# Patient Record
Sex: FEMALE | Race: WHITE
[De-identification: names, ages, dates, MRNs, and addresses within clinical notes are randomized per-mention and may not be internally consistent; named-entity substitution may affect disease eponyms.]

---

## 2017-01-04 ENCOUNTER — HOSPITAL ENCOUNTER (EMERGENCY)
Dept: HOSPITAL 25 - ED | Age: 67
LOS: 1 days | Discharge: HOME | DRG: 864 | End: 2017-01-05
Payer: MEDICARE

## 2017-01-04 DIAGNOSIS — F17.210: ICD-10-CM

## 2017-01-04 DIAGNOSIS — F03.90: ICD-10-CM

## 2017-01-04 DIAGNOSIS — R50.9: Primary | ICD-10-CM

## 2017-01-04 DIAGNOSIS — R53.83: ICD-10-CM

## 2017-01-04 DIAGNOSIS — R41.0: ICD-10-CM

## 2017-01-04 LAB
ADD DIFF/SLIDE REVIEW?: (no result)
ALBUMIN SERPL BCG-MCNC: 3.7 G/DL (ref 3.2–5.2)
ALP SERPL-CCNC: 107 U/L (ref 34–104)
ALT SERPL W P-5'-P-CCNC: 7 U/L (ref 7–52)
ANION GAP SERPL CALC-SCNC: 6 MMOL/L (ref 2–11)
AST SERPL-CCNC: 9 U/L (ref 13–39)
BUN SERPL-MCNC: 22 MG/DL (ref 6–24)
BUN/CREAT SERPL: 14.1 (ref 8–20)
CALCIUM SERPL-MCNC: 9.1 MG/DL (ref 8.6–10.3)
CHLORIDE SERPL-SCNC: 97 MMOL/L (ref 101–111)
GLOBULIN SER CALC-MCNC: 3.9 G/DL (ref 2–4)
GLUCOSE SERPL-MCNC: 183 MG/DL (ref 70–100)
HCO3 SERPL-SCNC: 23 MMOL/L (ref 22–32)
HCT VFR BLD AUTO: 33 % (ref 35–47)
HGB BLD-MCNC: 10.8 G/DL (ref 12–16)
MAGNESIUM SERPL-MCNC: 1.6 MG/DL (ref 1.9–2.7)
MCH RBC QN AUTO: 27 PG (ref 27–31)
MCHC RBC AUTO-ENTMCNC: 32 G/DL (ref 31–36)
MCV RBC AUTO: 83 FL (ref 80–97)
POTASSIUM SERPL-SCNC: 5.4 MMOL/L (ref 3.5–5)
PROT SERPL-MCNC: 7.6 G/DL (ref 6.4–8.9)
RBC # BLD AUTO: 4.04 10^6/UL (ref 4–5.4)
SODIUM SERPL-SCNC: 126 MMOL/L (ref 133–145)
TROPONIN I SERPL-MCNC: 0.05 NG/ML (ref ?–0.04)
TSH SERPL-ACNC: 0.39 MCIU/ML (ref 0.34–5.6)
WBC # BLD AUTO: 22 10^3/UL (ref 3.5–10.8)

## 2017-01-04 PROCEDURE — 80048 BASIC METABOLIC PNL TOTAL CA: CPT

## 2017-01-04 PROCEDURE — 84443 ASSAY THYROID STIM HORMONE: CPT

## 2017-01-04 PROCEDURE — 87641 MR-STAPH DNA AMP PROBE: CPT

## 2017-01-04 PROCEDURE — 93005 ELECTROCARDIOGRAM TRACING: CPT

## 2017-01-04 PROCEDURE — 71020: CPT

## 2017-01-04 PROCEDURE — G0480 DRUG TEST DEF 1-7 CLASSES: HCPCS

## 2017-01-04 PROCEDURE — 80053 COMPREHEN METABOLIC PANEL: CPT

## 2017-01-04 PROCEDURE — 86308 HETEROPHILE ANTIBODY SCREEN: CPT

## 2017-01-04 PROCEDURE — 81003 URINALYSIS AUTO W/O SCOPE: CPT

## 2017-01-04 PROCEDURE — 83605 ASSAY OF LACTIC ACID: CPT

## 2017-01-04 PROCEDURE — 80320 DRUG SCREEN QUANTALCOHOLS: CPT

## 2017-01-04 PROCEDURE — 99284 EMERGENCY DEPT VISIT MOD MDM: CPT

## 2017-01-04 PROCEDURE — 81015 MICROSCOPIC EXAM OF URINE: CPT

## 2017-01-04 PROCEDURE — 84145 PROCALCITONIN (PCT): CPT

## 2017-01-04 PROCEDURE — 87040 BLOOD CULTURE FOR BACTERIA: CPT

## 2017-01-04 PROCEDURE — 87502 INFLUENZA DNA AMP PROBE: CPT

## 2017-01-04 PROCEDURE — 85025 COMPLETE CBC W/AUTO DIFF WBC: CPT

## 2017-01-04 PROCEDURE — 84484 ASSAY OF TROPONIN QUANT: CPT

## 2017-01-04 PROCEDURE — 36415 COLL VENOUS BLD VENIPUNCTURE: CPT

## 2017-01-04 PROCEDURE — 83735 ASSAY OF MAGNESIUM: CPT

## 2017-01-04 NOTE — RAD
INDICATION: Shortness of breath. Cough. History of COPD and congestive heart failure.

Tobacco use.



COMPARISON: June 17, 2016



TECHNIQUE:  Dual energy PA and routine lateral views of the chest were obtained.



REPORT:  The patient is deviated to the LEFT with pendulous breasts superimposed over the

lung bases and resulting in artifactual opacity. Elevated lung volumes and moderately

coarse interstitial markings. Suggestion of small pleural effusions with interval decrease

on the LEFT. Mild prominence of the interstitial markings with subtle thickened peripheral

interlobular septa. Cardiomegaly. Mildly prominent central pulmonary vasculature. 



Unchanged mild mid thoracic spine vertebral compression fractures. No new thoracic spine

compression fractures evident. Healed fractures of the RIGHT seventh and eighth ribs noted

posterolaterally. No acute rib fracture evident.



IMPRESSION: The constellation of findings is most consistent with mild interstitial edema

and associated small pleural effusions less prominent than on the prior exam superimposed

on stigmata of advanced chronic obstructive pulmonary disease.

## 2017-01-04 NOTE — ED
Jordan LINARES Claudia, scribed for Miguel Oscar MD on 01/04/17 at 2204 .





Complex/Multi-Sys Presentation





- HPI Summary


HPI Summary: 





66 year old female presents to the ED via EMS. Family called EMS because per 

EMS pt is confused. Pt admits to fatigue. She is unable to identify where she 

is and the correct year. 





Level 5 caveat- confusion 





- History Of Current Complaint


Hx Obtained From: Patient


Hx From Patient Unobtainable Due To: Dementia


Associated Signs And Symptoms: Positive: Other - confused





- Allergies/Home Medications


Allergies/Adverse Reactions: 


 Allergies











Allergy/AdvReac Type Severity Reaction Status Date / Time


 


No Known Allergies Allergy   Verified 10/12/15 17:05














PMH/Surg Hx/FS Hx/Imm Hx


Previously Healthy: Yes


Endocrine/Hematology History: Reports: Hx Diabetes, Hx Anemia


Cardiovascular History: Reports: Hx Congestive Heart Failure, Hx Coronary 

Artery Disease, Hx Hypercholesterolemia, Hx Hypertension, Hx Myocardial 

Infarction - x 3


   Comment Only: Other Cardiovascular Problems/Disorders - -


Respiratory History: Reports: Hx Chronic Obstructive Pulmonary Disease (COPD), 

Other Respiratory Problems/Disorders - 1ppd smoker for 45 years


GI History: Reports: Hx Gastroesophageal Reflux Disease, Hx Hiatal Hernia, 

Other GI Disorders - hernia


 History: Reports: Hx Chronic Renal Failure - diabetic nephropathy, creat 

baseline 1.3


Sensory History: Reports: Hx Contacts or Glasses - reading


Opthamlomology History: Reports: Hx Contacts or Glasses - reading


Psychiatric History: Reports: Hx Anxiety, Hx Depression





- Surgical History


Surgery Procedure, Year, and Place: CARDIAC STENT PLACEMENT.  Cholecystectomy


Hx Anesthesia Reactions: No





- Immunization History


Date of Tetanus Vaccine: Unk


Date of Influenza Vaccine: Fall 2014





- Family History


Known Family History: Positive: Cardiac Disease - 2 grandparents, Other - 

father had cancer, unknown what kind. mother had breast cancer





- Social History


Lives: With Family


Alcohol Use: None


Hx Substance Use: No


Substance Use Type: Reports: None


Hx Tobacco Use: Yes - smokes 1/2 pack per day


Smoking Status (MU): Heavy Every Day Tobacco Smoker


Type: Cigarettes


Amount Used/How Often: 1 pack q day


Have You Smoked in the Last Year: Yes





Review of Systems





- ROS Summary


Review of Systems Summary: 





level 5 caveat- confused 


Positive: Fatigue


Neurological: Other - confused 


All Other Systems Reviewed And Are Negative: No





Physical Exam


Triage Information Reviewed: Yes


Vital Signs On Initial Exam: 


 Initial Vitals











Temp Pulse Resp BP Pulse Ox


 


 101 F   102   21   155/59   97 


 


 01/04/17 22:01  01/04/17 22:01  01/04/17 22:01  01/04/17 22:01  01/04/17 22:01











Vital Signs Reviewed: Yes


Appearance: Positive: Thin - malodorous


Skin: Positive: Warm


Head/Face: Positive: Normal Head/Face Inspection


Eyes: Positive: JESUSITA


ENT: Positive: Hearing grossly normal


Neck: Positive: Supple


Respiratory/Lung Sounds: Positive: Breath Sounds Present


Cardiovascular: Positive: Normal


Abdomen Description: Positive: Nontender, Soft


Bowel Sounds: Positive: Present


Musculoskeletal: Positive: Strength/ROM Intact


Neurological: Positive: Sensory/Motor Intact





Diagnostics





- Vital Signs


 Vital Signs











  Temp Pulse Resp BP Pulse Ox


 


 01/04/17 22:01  101 F  102  21  155/59  97














- Laboratory


Result Diagrams: 


 01/04/17 22:25





 01/04/17 22:25


Lab Statement: Any lab studies that have been ordered have been reviewed, and 

results considered in the medical decision making process.





- Radiology


  ** CHEST XRAY 


Xray Interpretation: Positive (See Comments) - THE CONSTELLATION OF FINDINGS IS 

MOST CONSISTENT WITH MILD INTERSTITIAL EDEMA AND ASSOCIATED SMALL PLEURAL 

EFFUSIONS LESS PROMINENT THAN ON THE PRIOR EXAN SUPERIMPOSED ON STIGMATA OF 

ADVANCED COPD.


Radiology Interpretation Completed By: Radiologist





- EKG


  ** 22:06


Cardiac Rate: NL


EKG Rhythm: Sinus Rhythm - 96 beats/min


EKG Interpretation: left posterior hemiblock 





Complex Multi-Symp Course/Dx


Assessment/Plan: Pt presents with confusion. After a work-up, CXR and EKG. Dr. Selby will admit patient to [facility].





- Diagnoses


Provider Diagnoses: 


 Fever








- Physician Notifications


Discussed Care Of Patient With: 23:49.  Discussed care of patient with Dr. Selby. Dr. Selby will admit patient to [facility].


Time Discussed With Above Provider: 23:50


Instructed by Provider To: Admit As Inpatient





Discharge





- Discharge Plan


Condition: Fair


Disposition: ADMITTED TO [facility] documentation as recorded by the Jordan abad Claudia accurately 

reflects the service I personally performed and the decisions made by me, 

Miguel Oscar MD.

## 2017-01-05 VITALS — SYSTOLIC BLOOD PRESSURE: 150 MMHG | DIASTOLIC BLOOD PRESSURE: 67 MMHG

## 2017-01-05 LAB
ANION GAP SERPL CALC-SCNC: 6 MMOL/L (ref 2–11)
BUN SERPL-MCNC: 19 MG/DL (ref 6–24)
BUN/CREAT SERPL: 13.9 (ref 8–20)
CALCIUM SERPL-MCNC: 8.6 MG/DL (ref 8.6–10.3)
CHLORIDE SERPL-SCNC: 105 MMOL/L (ref 101–111)
GLUCOSE SERPL-MCNC: 138 MG/DL (ref 70–100)
HCO3 SERPL-SCNC: 20 MMOL/L (ref 22–32)
HCT VFR BLD AUTO: 30 % (ref 35–47)
HGB BLD-MCNC: 9.6 G/DL (ref 12–16)
MANUAL ENTRY VERIFICATION: (no result)
MCH RBC QN AUTO: 26 PG (ref 27–31)
MCHC RBC AUTO-ENTMCNC: 32 G/DL (ref 31–36)
MCV RBC AUTO: 83 FL (ref 80–97)
MONO INTERNAL CONTROL: (no result)
MONO KIT LOT#: (no result)
POTASSIUM SERPL-SCNC: 5 MMOL/L (ref 3.5–5)
RBC # BLD AUTO: 3.63 10^6/UL (ref 4–5.4)
SODIUM SERPL-SCNC: 131 MMOL/L (ref 133–145)
WBC # BLD AUTO: 15 10^3/UL (ref 3.5–10.8)

## 2017-01-05 RX ADMIN — ASPIRIN SCH MG: 81 TABLET, COATED ORAL at 10:31

## 2017-01-05 RX ADMIN — ASPIRIN SCH: 81 TABLET, COATED ORAL at 07:24

## 2017-01-05 NOTE — HP
HISTORY AND PHYSICAL:

 

DATE OF ADMISSION:  01/05/17

 

TIME OF EVALUATION:  0015.

 

PRIMARY CARE PHYSICIAN:  Shabbir Roth MD

 

CHIEF COMPLAINT:  Fever and altered mental status.

 

HISTORY OF PRESENT ILLNESS:  This is a 66-year-old female with a past medical 
history of COPD, on chronic oxygen; diabetes; hypertension, who presents to the 
emergency room with fever and concern for altered mental status.  The patient 
states she started with a fever today and not feeling well.  Her family, 
daughter and son-in-law, who lives with her called the EMS, they were concerned 
about her mental status and she was sent here via EMS for further evaluation.  
The patient states she has a chronic cough.  She does not have worsening 
shortness of breath. No chest pain, no nausea, vomiting, diarrhea.  No 
abdominal pain.  No urinary symptoms.  No myalgias or rash.  No sick contacts.  
No recent travel.  Otherwise, remaining review of systems is negative.  In the 
emergency room, the patient had labs, imaging.  She was given a liter of fluid 
and was referred to the hospitalist service for further evaluation.

 

PAST MEDICAL HISTORY:

1.  COPD, on 2 L continuous.

2.  Diabetes.

3.  Coronary artery disease.

4.  GERD.

5.  Depression.

6.  Chronic pain.

7.  Tobacco use.

8.  Hyperlipidemia.

 

MEDICATIONS:  The patient has a limited provided, they are as follows:

 

1.  Duloxetine DR 60 mg p.o. daily.

2.  Glipizide 10 mg.

3.  Metformin 500 mg.

4.  Oxycodone/acetaminophen 10/325.

5.  Spironolactone 25 mg p.o. daily.

6.  Simvastatin 80 mg p.o. daily.

7.  Gabapentin 600 mg.

 

Of note, this list does not provide frequency of when she takes them.  She 
states she takes inhalers but is unable to tell me which one they are.  On the 
discharge summary from June, it appears she takes albuterol inhaler, 
fluticasone and salmeterol one puff inhaled b.i.d., and Spiriva 1 inhaled daily.

 

ALLERGIES:  No known drug allergies.

 

FAMILY HISTORY:  Reviewed and noncontributory.

 

SOCIAL HISTORY:  The patient lives with her daughter and son-in-law.  She is 
still smoking.  She states she has been smoking 2 packs per day for 50 years.  
No alcohol or illicit drug use.  Her daughter, Airam Pickard, is her healthcare 
proxy.  She is a DNR/DNI.

 

REVIEW OF SYSTEMS:  As mentioned in the HPI.

 

                               PHYSICAL EXAMINATION

 

GENERAL:  In no acute distress, disheveled appearing.

 

VITAL SIGNS:  Temp T-max of 101, pulse rate 102, respiratory rate 21, oxygen 
saturation 97% on 2 L, blood pressure 155/59.

 

HEENT:  Pupils equal and reactive, anicteric.  Oropharynx:  Mucous membranes 
are moist.  No erythema or exudate.  Head:  Normocephalic.

 

NECK:  Supple.  No lymphadenopathy.  No nuchal rigidity.

 

RESPIRATORY:  Diminished breath sounds.  No wheezes, rhonchi, or rales. Poor 
aeration.

 

CARDIAC:  Regular rate and rhythm.  Distant heart sounds.  Soft systolic murmur 
heard throughout.

 

ABDOMEN:  Soft, nontender, nondistended.

 

EXTREMITIES:  The patient with poor hygiene in her lower extremities.  No 
clubbing, cyanosis, or edema.

 

NEUROLOGIC:  Alert and oriented x2; oriented to place and self.  No focal 
neurologic deficits.

 

 DIAGNOSTIC STUDIES/LAB DATA:  White count 22, hemoglobin 10.8, hematocrit 33, 
platelets 419.  Sodium 126, potassium 5.4, chloride 97, BUN 22, creatinine 1.56
, bicarb 23, glucose 183, magnesium 1.6, lactic acid 1.6.  Troponin 0.05.  
Urine is unremarkable, +2 protein.  Toxicology is negative alcohol.

 

Chest x-ray:  No significant findings on wet read.

 

EKG showing normal sinus.

 

ASSESSMENT AND PLAN:  This is a 66-year-old female with a past medical history 
of chronic obstructive pulmonary disease, on continuous oxygen, who is still 
smoking who presents to the emergency room with fever and altered mental status.

 

1.  Fever and altered mental status.  

Assessment:  The patient's history and physical are consistent with systemic 
inflammatory response syndrome.  It is unclear the etiology behind her fever.  
I am swabbing her right now for flu.  She has no respiratory symptoms.  Her 
chest x-ray is unremarkable.  This could be early onset of viral illness, not 
otherwise specified.  

Plan:  No indication for antibiotics at this time.  We will follow up on blood 
cultures.  If she continues to deteriorate, we will start her on antibiotics. 
If she gets dehydrated, we will continue to hydrate her.  We will follow up on 
the flu swab and repeat her labs in the morning.



2.  Acute kidney injury.  Assessment is likely in the setting of her infectious 
process.  It appears that she has an underlying chronic kidney disease based on 
prior creatinines.  

Plan:  We will renally dose her meds.  Repeat her labs in the morning and IV 
fluids.



3.  Indeterminate troponin.  Assessment:  The patient with a troponin of 0.05 
with no significant findings on EKG and no chest pain.  She does have a history 
of coronary artery disease.  I suspect this is demand ischemia in the setting 
of her infection.  

Plan:  We will put her on telemetry, trend her troponins, and start her on a 
baby aspirin.

 

CHRONIC MEDICAL PROBLEMS:

1.  COPD.  We will resume her presumed inhaler regimen that was from discharge 
summary in June of 2016.

2.  Diabetes.  We will place her on lispro and Lantus, hold oral agents.  We 
will continue her Effexor and her gabapentin and recommend contacting the 
pharmacy in the morning to get an accurate complete med rec for the remaining 
medications.

3.  FEN.  We will place her on a diabetic diet.

4.  DVT prophylaxis.  She scores high risk.  Will be placed on heparin subcu 
t.i.d.

5.  Disposition planning.  Due to patient's appearing poorly disheveled and not 
well taking care of, we will consult Social Work to help with safe discharge 
planning.

6.  Code status.  DNR/DNI.

 

PATIENT TIME:  Greater than 45 minutes spent doing the history and physical, 
more than half the time was spent in direct patient contact.

 

CC:  Shabbir Roth MD*

 

[id number]/[facility] #: [id number]

BRIAN

## 2017-01-06 NOTE — DS
DISCHARGE SUMMARY:

 

DATE OF ADMISSION:  01/05/17

 

DATE OF DISCHARGE:  01/05/17

 

PRIMARY CARE PHYSICIAN:  Dr. Shabbir Roth.

 

DISCHARGE DIAGNOSIS:  An episode of fever and altered mental status, most 
likely due to systemic inflammatory response syndrome and most likely viral 
illness.

 

SECONDARY DIAGNOSES:

1.  Chronic obstructive pulmonary disease, on 2 L continuously.

2.  Diabetes.

3.  Coronary artery disease.

4.  Gastroesophageal reflux disease.

5.  Depression.

6.  Chronic pain.

7.  History of chronic tobacco use.

8.  Hyperlipidemia.

 

MEDICATIONS AT DISCHARGE:  Unchanged from admission and include:

 

1.  Duloxetine DR 60 mg daily.

2.  Glipizide 10 mg daily.

3.  Metformin 500 mg daily.

4.  Oxycodone/acetaminophen 10/325 mg on a p.r.n. basis.

5.  Aldactone 25 mg daily.

6.  Simvastatin 80 mg daily.

7.  Gabapentin 600 mg previously taken.

8.  The patient also has Spiriva 1 inhalation daily.

9.  The patient is instructed to continue her albuterol inhaler and fluticasone 
and salmeterol inhalers as previously taken.

10.  The patient is using DuoNeb nebulizers on a p.r.n. basis, and she is to 
continue them as previously taken.

 

HOSPITALIZATION COURSE:  Please note the patient was at ED hold during her 15-
hour hospital stay.  She initially was admitted to the hospital on 01/05/17 
early after midnight with an alteration of mental status.  She appeared to be 
in systemic inflammatory response syndrome with leukocytosis, but no evidence 
of infection. She also had a temperature of 101 degrees.  Troponin was mildly 
elevated at 0.05 and it is chronic and consistent with prior reports.  Her EKG 
was unchanged.  Her magnesium was at 1.6 and that was replaced her with IV 
magnesium.  She also has chronic kidney disease stage 3, most likely due to the 
diabetes with creatinine that had been unchanged, but she was slightly 
hyponatremic and hyperkalemic that resolved after treatment with intravenous 
fluids.

 

Nevertheless, once the patient was seen within the 14 hours of her admission, 
she was back to her baseline mentally.  The patient does have history of being 
confused at night and I believe she probably sundowns.  In fact, she was 
thoroughly evaluated for a similar occurrence in March 2016 and please refer to 
further medical records for this evaluation that included an EEG.

 

Once again, the patient's leukocytosis with a white blood cell count of 22,000 
resolved spontaneously and was down to 15,000 after intravenous fluids in the 
emergency department.  The patient had been euthermic throughout her hospital 
stay apart from the initial temperature of 101 degrees recorded at admission.  
Her microbiology studies show influenza tests that were negative.  Her blood 
cultures are still pending.  Her urinalysis was unremarkable for infection.

 

At this point, the patient is ready to go home and appears to be back to her 
baseline.  She is going to be discharged home with recommendations to follow up 
with Dr. Roth in approximately 4-7 days.  No medications are changed.

 

Smoking cessation was strongly recommend to the patient, but the patient 
appears not too willing to follow those recommendations and she still smokes a 
pack per day of cigarettes.  She is aware of the dangers of smoking when on 
oxygen.  I strongly recommended against it.

 

PHYSICAL EXAMINATION AT THE TIME OF DISCHARGE:  Blood pressure of 150/67, heart 
rate of 97 and regular, respiratory rate 18, oxygen saturation 95% on 2 L O2 
nasal cannula, temperature 98.8.

 

General:  The patient is a very pleasant 66-year-old female, slightly disheveled
, of thin body habitus, who is in no acute distress.  The patient is alert, 
awake, and oriented x2.  She is oriented to the month of January and 2017, but 
not the exact date.

 

HEENT:  Head atraumatic, normocephalic.  Eyes:  Pupils equal, reactive to light 
and accommodation.  Oropharynx is clear.  Mucosa moist.

 

Neck:  Supple.  No JVD.  No bruit bilaterally.

 

Cardiovascular:  Regular rate and rhythm.  No murmur.

 

Respiratory:  Distant breath sounds bilaterally with very scattered wheezes in 
the bilateral upper lungs.  Otherwise clear.

 

Abdomen:  Soft, nontender, bowel sounds positive in all 4 quadrants.  Visible 
midline abdominal hernia noted that is supraumbilical.  There is no tenderness 
to palpation of the hernia.

 

Extremities:  There is no edema.  Pulses +2 bilaterally.  No clubbing or 
cyanosis.

 

Neurologic:  Speech clear.  Cranial nerves II through XII grossly intact.  
Motor strength is 5/5 bilaterally.

 

Please note that this is a short summary of the patient's hospital stay.  
Please refer to further medical records for details.

 



ADDENDUM TO DISCHARGE SUMMARY:

 

DATE OF ADMISSION:  01/05/17

 

DATE OF DISCHARGE:  01/05/17

 

HOME MEDICATIONS:

1.  Advair Diskus 50/50 one inhalation twice a day.

2.  Albuterol inhaler 2 puffs every 4 hours p.r.n.

3.  Aspirin 81 mg daily.

4.  Digoxin 250 mcg daily.

5.  Cymbalta 60 mg daily.

6.  Pepcid 40 mg b.i.d.

7.  Gabapentin 600 mg 3 times a day.

8.  Glipizide 10 mg b.i.d.

9.  Metformin 500 mg b.i.d.

10.  Ramipril 2.5 mg daily.

11.  Risperdal 0.25 mg at night.

12.  Simvastatin 80 mg daily.

13.  Iron  mg 3 times a day.

14.  Lantus insulin 10 units q.h.s.

15.  Magnesium oxide 500 mg.  The patient takes 2 capsules, which is 1000 mg 
daily.

16.  Percocet 10/325 mg every 4 to 6 hours as needed.

17.  Singulair 10 mg daily.

18.  Vitamin C 1 tablet daily 500 mg.

19.  Voltaren 1% topical gel, apply up to 4 times a day for psoriasis on the 
body.

 

The patient is asked to discontinue her spironolactone due to mild hyperkalemia 
with which she presented to the emergency department and to refer to primary 
care provider in 4to 6 days for a followup visit for further recommendations.

 



CC:  Dr. Shabbir Roth*

 

 [id number]/[facility] #: [id number]

 [id number]/[facility] #: [id number]



[email or web address]

## 2017-01-06 NOTE — DS
DISCHARGE SUMMARY:

 

ADDENDUM:

 

DATE OF ADMISSION:  01/05/17

 

DATE OF DISCHARGE:  01/05/17

 

HOME MEDICATIONS:

1.  Advair Diskus 50/50 one inhalation twice a day.

2.  Albuterol inhaler 2 puffs every 4 hours p.r.n.

3.  Aspirin 81 mg daily.

4.  Digoxin 250 mcg daily.

5.  Cymbalta 60 mg daily.

6.  Pepcid 40 mg b.i.d.

7.  Gabapentin 600 mg 3 times a day.

8.  Glipizide 10 mg b.i.d.

9.  Metformin 500 mg b.i.d.

10.  Ramipril 2.5 mg daily.

11.  Risperdal 0.25 mg at night.

12.  Simvastatin 80 mg daily.

13.  Iron  mg 3 times a day.

14.  Lantus insulin 10 units q.h.s.

15.  Magnesium oxide 500 mg.  The patient takes 2 capsules, which is 1000 mg daily.

16.  Percocet 10/325 mg every 4 to 6 hours as needed.

17.  Singulair 10 mg daily.

18.  Vitamin C 1 tablet daily 500 mg.

19.  Voltaren 1% topical gel, apply up to 4 times a day for psoriasis on the body.

 

The patient is asked to discontinue her spironolactone due to mild hyperkalemia with which she prese
nted to the emergency department and to refer to primary care provider in 4to 6 days for a followup 
visit for further recommendations.

 

 [id number]/[facility] #: [id number]

## 2018-10-30 ENCOUNTER — HOSPITAL ENCOUNTER (EMERGENCY)
Dept: HOSPITAL 25 - ED | Age: 68
Discharge: HOME | End: 2018-10-30
Payer: MEDICARE

## 2018-10-30 VITALS — DIASTOLIC BLOOD PRESSURE: 74 MMHG | SYSTOLIC BLOOD PRESSURE: 122 MMHG

## 2018-10-30 DIAGNOSIS — R11.0: ICD-10-CM

## 2018-10-30 DIAGNOSIS — E11.649: Primary | ICD-10-CM

## 2018-10-30 DIAGNOSIS — R53.1: ICD-10-CM

## 2018-10-30 DIAGNOSIS — R47.81: ICD-10-CM

## 2018-10-30 DIAGNOSIS — F17.210: ICD-10-CM

## 2018-10-30 LAB
BASOPHILS # BLD AUTO: 0.1 10^3/UL (ref 0–0.2)
EOSINOPHIL # BLD AUTO: 0.2 10^3/UL (ref 0–0.6)
HCT VFR BLD AUTO: 44 % (ref 35–47)
HGB BLD-MCNC: 14.6 G/DL (ref 12–16)
INR PPP/BLD: 0.85 (ref 0.77–1.02)
LYMPHOCYTES # BLD AUTO: 1 10^3/UL (ref 1–4.8)
MCH RBC QN AUTO: 29 PG (ref 27–31)
MCHC RBC AUTO-ENTMCNC: 33 G/DL (ref 31–36)
MCV RBC AUTO: 87 FL (ref 80–97)
MONOCYTES # BLD AUTO: 1.8 10^3/UL (ref 0–0.8)
NEUTROPHILS # BLD AUTO: 11.9 10^3/UL (ref 1.5–7.7)
NRBC # BLD AUTO: 0 10^3/UL
NRBC BLD QL AUTO: 0.1
PLATELET # BLD AUTO: 433 10^3/UL (ref 150–450)
RBC # BLD AUTO: 5.05 10^6/UL (ref 4–5.4)
WBC # BLD AUTO: 14.9 10^3/UL (ref 3.5–10.8)
WBC UR QL AUTO: (no result)

## 2018-10-30 PROCEDURE — 85610 PROTHROMBIN TIME: CPT

## 2018-10-30 PROCEDURE — 84484 ASSAY OF TROPONIN QUANT: CPT

## 2018-10-30 PROCEDURE — 81003 URINALYSIS AUTO W/O SCOPE: CPT

## 2018-10-30 PROCEDURE — G0480 DRUG TEST DEF 1-7 CLASSES: HCPCS

## 2018-10-30 PROCEDURE — 83880 ASSAY OF NATRIURETIC PEPTIDE: CPT

## 2018-10-30 PROCEDURE — 36415 COLL VENOUS BLD VENIPUNCTURE: CPT

## 2018-10-30 PROCEDURE — 99284 EMERGENCY DEPT VISIT MOD MDM: CPT

## 2018-10-30 PROCEDURE — 71045 X-RAY EXAM CHEST 1 VIEW: CPT

## 2018-10-30 PROCEDURE — 85025 COMPLETE CBC W/AUTO DIFF WBC: CPT

## 2018-10-30 PROCEDURE — 80320 DRUG SCREEN QUANTALCOHOLS: CPT

## 2018-10-30 PROCEDURE — 70450 CT HEAD/BRAIN W/O DYE: CPT

## 2018-10-30 PROCEDURE — 93005 ELECTROCARDIOGRAM TRACING: CPT

## 2018-10-30 PROCEDURE — 87086 URINE CULTURE/COLONY COUNT: CPT

## 2018-10-30 PROCEDURE — 81015 MICROSCOPIC EXAM OF URINE: CPT

## 2018-10-30 PROCEDURE — 83605 ASSAY OF LACTIC ACID: CPT

## 2018-10-30 PROCEDURE — 80053 COMPREHEN METABOLIC PANEL: CPT

## 2018-10-30 NOTE — ED
Altered Mental Status





- HPI Summary


HPI Summary: 


Level 5 Caveat: Unable to obtain complete HPI due to AMS





The pt [facility] a 69 [facility] 2 days ago worsened 

yesterday. She notes confusion, dry mouth, fatigue, nausea, and difficulty 

walking but denies dysuria.  Her daughter reports that the pt recently got 

discharged from hospital where she had been admitted for COPD and a UTI. Since 

then, the pt has been different. She appears confused and agitated.  Ap per EMS

, the pt appeared pale and diaphoretic on arrival. Her BG was 37 mg/dL on EMS 

arrival, 71 mg/dL en route and 60mg/dL on arrival to the ED. The pt resisted 

attempts for IV access en route. She also appeared pale, cool to touch  and 

diaphoretic. 





- History Of Current Complaint


Stated Complaint: SLURRED SPEECH/LT SIDE WEAKNESS


Time Seen by Provider: 10/30/18 20:03


Hx Obtained From: Patient, Family/Caretaker - Daughter, EMS


Hx From Patient Unobtainable Due To: Altered Mental Status


Onset/Duration: Still Present, Gradually


Timing: Constant


Character: Confusion, Agitation


Associated Signs And Symptoms: Positive: Nausea, Weakness





- Allergies/Home Medications


Allergies/Adverse Reactions: 


 Allergies











Allergy/AdvReac Type Severity Reaction Status Date / Time


 


No Known Allergies Allergy   Verified 10/12/15 17:05














PMH/Surg Hx/FS Hx/Imm Hx


Previously Healthy: No - Level 5 Caveat: Unable to obtain complete Mhx due to 

AMS


Endocrine/Hematology History: Reports: Hx Diabetes, Hx Anemia


Cardiovascular History: Reports: Hx Congestive Heart Failure, Hx Coronary 

Artery Disease, Hx Hypercholesterolemia, Hx Hypertension, Hx Myocardial 

Infarction - x 3


   Comment Only: Other Cardiovascular Problems/Disorders - -


Respiratory History: Reports: Hx Chronic Obstructive Pulmonary Disease (COPD), 

Other Respiratory Problems/Disorders - 1ppd smoker for 45 years


GI History: Reports: Hx Gastroesophageal Reflux Disease, Hx Hiatal Hernia, 

Other GI Disorders - hernia


 History: Reports: Hx Chronic Renal Failure - diabetic nephropathy, creat 

baseline 1.3


Sensory History: Reports: Hx Contacts or Glasses - reading


Opthamlomology History: Reports: Hx Contacts or Glasses - reading


Psychiatric History: Reports: Hx Anxiety, Hx Depression





- Cancer History


Cancer Type, Location and Year: None reported





- Surgical History


Surgery Procedure, Year, and Place: Cardiac Stent Placement.  Cholecystectomy


Hx Anesthesia Reactions: No





- Immunization History


Date of Tetanus Vaccine: Unk


Date of Influenza Vaccine: Fall 2014


Infectious Disease History: 


   Denies: Traveled Outside the [place] in Last 30 Days





- Family History


Known Family History: Positive: Cardiac Disease - 2 grandparents, Other - 

father had cancer, unknown what kind. mother had breast cancer





- Social History


Lives: With Family


Alcohol Use: None


Hx Substance Use: No


Substance Use Type: Reports: None


Hx Tobacco Use: Yes - smokes 1/2 pack per day


Smoking Status (MU): Heavy Every Day Tobacco Smoker


Type: Cigarettes


Amount Used/How Often: 1 pack q day


Have You Smoked in the Last Year: Yes





Review of Systems





- ROS Summary


Review of Systems Summary: 


Level 5 Caveat: Unable to obtain complete ROS due to AMS


Constitutional: Other - Positive: Weakness, confusion, agitation 


Positive: Fatigue, Skin Diaphoresis


Gastrointestinal: Other - Positive: Dry mouth


Positive: Nausea


Negative: dysuria


Skin: Other - Positive: cool to touch, pale 


Positive: Weakness


All Other Systems Reviewed And Are Negative: No





Physical Exam





- Summary


Physical Exam Summary: 


Level 5 Caveat: Unable to obtain complete PE  due to AMS





Appearance: The patient is well-nourished in no acute distress and in no acute 

pain.She arouses to voice and answers simple questions


 


Skin: The skin is warm and dry and skin color reflects adequate perfusion.


 


HEENT: The head is normocephalic and atraumatic. The pupils are equal and 

reactive. The conjunctivae are clear and without drainage. Nares are patent and 

without drainage. Mouth reveals moist mucous membranes and the throat is 

without erythema and exudate. The external ears are intact. The ear canals are 

patent and without drainage. The tympanic membranes are intact.


 


Neck: The neck is supple with full range of motion and non-tender. There are no 

carotid bruits. There is no neck vein distension.


 


Respiratory: Chest is non-tender. Lungs are clear to auscultation and breath 

sounds are symmetrical and equal.


 


Cardiovascular: Heart is regular rate and rhythm. There is no murmur or rub 

auscultated. There is no peripheral edema and pulses are symmetrical and equal.


 


Abdomen: The abdomen is soft and non-tender. There are normal bowel sounds 

heard in all four quadrants and there is no organomegaly palpated.


 


Musculoskeletal: There is no back tenderness noted. Extremities are non-tender 

with full range of motion. There is good capillary refill. There is no 

peripheral edema or calf tenderness elicited.


 


Neurological: Patient is alert and oriented to person, place and time. The 

patient has symmetrical motor strength in all four extremities. Cranial nerves 

are grossly intact. Deep tendon reflexes are symmetrical and equal in all four 

extremities.


 


Psychiatric: The patient has an appropriate affect and does not exhibit any 

anxiety or depression. 





GCS: 14 





Triage Information Reviewed: Yes


Vital Signs On Initial Exam: 





 Initial Vital Signs











Temp  96.4 F   10/30/18 20:06


 


Pulse  59   10/30/18 20:06


 


Resp  25   10/30/18 20:06


 


BP  136/66   10/30/18 20:06


 


Pulse Ox  100   10/30/18 20:06











Vital Signs Reviewed: Yes


Completion Of Physical Exam Limited Due To: Altered Mental Status





Diagnostics





- Laboratory


Result Diagrams: 


 10/30/18 20:33





 10/30/18 20:25


Lab Statement: Any lab studies that have been ordered have been reviewed, and 

results considered in the medical decision making process.





- Radiology


  ** CXR 


Radiology Interpretation Completed By: ED Physician - IMPRESSION:Left heart 

border is somewhat obscured but the exam is consistent with her most recent 

read as mild pulmonary edema.





- CT


  ** Brain CT


CT Interpretation Completed By: Radiologist - IMPRESSION: No acute intracranial 

abnormality. The ED physician reviewed this radiology report.





- EKG


  ** 20:07 


EKG Rhythm: Atrial Fibrillation


Summary of EKG Findings: Rate: 93 bpm





Altered Mental Statu Course/Dx





- Course


Course Of Treatment: Ms. Parmar presented to the emergency department and 

confused and agitated state.  She has had a recent admission to the hospital 

and since she's been home her daughter is not felt that she's been completely 

back to normal.  Her fingerstick blood sugar earlier today was over 300 and she 

was covered with insulin just prior to calling the ambulance.  The eminence 

found her blood sugar to be 70 on fingerstick.  He her blood sugar was 60 and 

she was given orange juice while the rest of her labs were obtained.  On 

initial presentation she was somewhat lethargic and confused.  By the time her 

blood sugar came back at 50 she was back to normal after drinking 2 glasses of 

orange juice.  Even though her symptoms seem to have occurred when her blood 

sugar was high according to the family, she improved completely with blood 

sugar here and the family agrees that she is back to her normal self.  She is 

eating something now and will be discharged as long as she remains stable.





- Diagnoses


Provider Diagnoses: 


 Hypoglycemic reaction








Discharge





- Sign-Out/Discharge


Documenting (check all that apply): Patient Departure





- Discharge Plan


Condition: Stable


Disposition: HOME


Patient Education Materials:  Hypoglycemia in a Person with Diabetes (ED)


Referrals: 


Gonzalez COOPER,Shabbir ROWELL [Primary Care Provider] - 





- Billing Disposition and Condition


Condition: STABLE


Disposition: Home





- Attestation Statements


Document Initiated by Scribe: Yes


Documenting Scribe: Orin Aparicio 


Provider For Whom Benny is Documenting (Include Credential): Dr. José Antonio Nino MD 


Scribe Attestation: 


Orin LINARES , scribed for Dr. José Antonio Nino MD  on 10/30/18 at 2149. 


Scribe Documentation Reviewed: Yes


Provider Attestation: 


The documentation as recorded by the scribeOrin  accurately 

reflects the service I personally performed and the decisions made by me, Dr. José Antonio Nino MD

## 2018-10-30 NOTE — RAD
EXAM: 

 CT Head Without Intravenous Contrast 



EXAM DATE/TIME: 

 10/30/2018 8:44 PM 



CLINICAL HISTORY: 

 68 years old, female; Pain; Headache; Additional info: AMS 



TECHNIQUE: 

 Axial computed tomography images of the head/brain without intravenous 

contrast. 

 All CT scans at this facility use at least one of these dose optimization 

techniques: automated exposure control; mA and/or kV adjustment per patient 

size (includes targeted exams where dose is matched to clinical indication); or 

iterative reconstruction. 



COMPARISON: 

 BRAIN WO CT BRAIN WO 3/8/2016 4:56 AM 



FINDINGS: 

 Brain:  Decreased attenuation of the supratentorial white matter is likely 

secondary to chronic microvascular ischemia. No acute intracranial hemorrhage. 

 Ventricles:  Ventricular and subarachnoid spaces are age appropriate. 

 Bones/joints: Normal. No acute fracture. 

 Sinuses:  Polypoid mucosal disease involving the right greater than left 

maxillary sinuses. 

 Mastoid air cells:  Partial opacification of the bilateral mastoid air cells 

 Soft tissues: Normal. 

 Vasculature:  Intracranial vascular calcification. 



IMPRESSION: 

No acute intracranial abnormality. 



To contact [facility] with a general question: [facility] - [phone]

For direct physician to physician contact: Physician Hotline - [phone]

[facility] ([facility] Facility ID #[id number])

## 2018-10-31 NOTE — PN
Progress Note





- Progress Note


Date of Service: 10/30/18


Note: 


Pt. seen in ER yesterday for hypoglycemia and altered mental status.  The side 

chest x-ray done at that time.  Radiology read is concerning for possible early 

consolidation versus atelectasis.  Attempted to call patient at home today at 

1842, message left. Will attempt to call again tomorrow.

## 2018-10-31 NOTE — RAD
HISTORY: AMS



COMPARISONS: January 04, 2017 



VIEWS: 1: frontal AP view of the chest at 8:21 PM 



FINDINGS:

LINES AND TUBES: None.

CARDIOMEDIASTINAL SILHOUETTE: The cardiomediastinal silhouette is normal for portable

technique.

PLEURA: The costophrenic angles are sharp. No pleural abnormalities are noted.

LUNG PARENCHYMA: There is patchy alveolar opacification of the left lung base. There is

prominence of the central pulmonary vasculature. 

ABDOMEN: The upper abdomen is clear. There is no subphrenic gas.

BONES AND SOFT TISSUES: No bone or soft tissue abnormalities are noted.



IMPRESSION: PULMONARY VASCULAR CONGESTION WITH LEFT BASILAR ATELECTASIS VERSUS EARLY

CONSOLIDATION. 



R2

## 2018-11-01 NOTE — PN
Progress Note





- Progress Note


Date of Service: 11/01/18


Note: 


patient called back yesterday and today and discussed no fever. no cough. so 

will not treat as pneumonia. will follow up with primary about chest xray 

results.

## 2020-02-02 ENCOUNTER — HOSPITAL ENCOUNTER (EMERGENCY)
Dept: HOSPITAL 25 - ED | Age: 70
Discharge: HOME | End: 2020-02-02
Payer: MEDICARE

## 2020-02-02 VITALS — SYSTOLIC BLOOD PRESSURE: 143 MMHG | DIASTOLIC BLOOD PRESSURE: 63 MMHG

## 2020-02-02 DIAGNOSIS — M85.88: ICD-10-CM

## 2020-02-02 DIAGNOSIS — M79.10: ICD-10-CM

## 2020-02-02 DIAGNOSIS — R07.81: Primary | ICD-10-CM

## 2020-02-02 DIAGNOSIS — N18.9: ICD-10-CM

## 2020-02-02 DIAGNOSIS — M25.552: ICD-10-CM

## 2020-02-02 DIAGNOSIS — I25.2: ICD-10-CM
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
## 2020-02-02 DIAGNOSIS — J44.9: ICD-10-CM

## 2020-02-02 DIAGNOSIS — I13.0: ICD-10-CM

## 2020-02-02 DIAGNOSIS — Z99.81: ICD-10-CM

## 2020-02-02 DIAGNOSIS — J98.11: ICD-10-CM

## 2020-02-02 DIAGNOSIS — Z95.5: ICD-10-CM

## 2020-02-02 DIAGNOSIS — F17.210: ICD-10-CM

## 2020-02-02 PROCEDURE — 99283 EMERGENCY DEPT VISIT LOW MDM: CPT

## 2020-02-02 PROCEDURE — 72190 X-RAY EXAM OF PELVIS: CPT

## 2020-02-02 PROCEDURE — 71046 X-RAY EXAM CHEST 2 VIEWS: CPT

## 2020-02-02 PROCEDURE — 72192 CT PELVIS W/O DYE: CPT

## 2020-02-02 PROCEDURE — 72220 X-RAY EXAM SACRUM TAILBONE: CPT

## 2020-02-02 NOTE — ED
Adult Trauma





- HPI Summary


HPI Summary: 





69 year old female presents to the ED with a chief complaint of buttock, left 

hip and lower chest wall pain secondary to falling from a standing position 

PTA. She did not lose consciousness. Patient usually uses a walker, but is now 

unable to walk 2/2 pain. Patient is usually on 2L O2. History of CHF, HTN, MI, 

and CRF.  Patient denies LOC or hitting head. No WHITNEY. Not on blood thinners.





- History of Current Complaint


Chief Complaint: EDFall


Stated Complaint: FALLS & HIP/BACK PAIN PER EMS


Time Seen by Provider: 02/02/20 17:53


Hx Obtained From: Patient


Pregnant?: No


Mechanism of Injury: Fall - From a standing position


Loss of Consciousness: no loss of consciousness


Onset of Pain: Immediate


Onset Severity: Severe


Current Severity: Severe


Pain Intensity: 8


Pain Scale Used: 0-10 Numeric


Location: Chest, Abdomen/Pelvis


Aggravating Factor(s): Weight Bearing


Alleviating Factor(s): Nothing


Associated Signs & Symptoms: Positive: Chest Pain - Chest wall.  Negative: Loss 

of Consciousness





- Additional Pertinent History


Primary Care Physician: MAIK





- Allergy/Home Medications


Allergies/Adverse Reactions: 


 Allergies











Allergy/AdvReac Type Severity Reaction Status Date / Time


 


No Known Allergies Allergy   Verified 06/19/19 13:56














PMH/Surg Hx/FS Hx/Imm Hx


Endocrine/Hematology History: Reports: Hx Anemia


   Denies: Hx Diabetes


Cardiovascular History: Reports: Hx Congestive Heart Failure, Hx Coronary 

Artery Disease, Hx Hypercholesterolemia, Hx Hypertension, Hx Myocardial 

Infarction - x 3


   Comment Only: Other Cardiovascular Problems/Disorders - -


Respiratory History: Reports: Hx Chronic Obstructive Pulmonary Disease (COPD), 

Other Respiratory Problems/Disorders - 1ppd smoker for 45 years


GI History: Reports: Hx Gastroesophageal Reflux Disease, Hx Hiatal Hernia, 

Other GI Disorders - hernia


 History: Reports: Hx Chronic Renal Failure - diabetic nephropathy, creat 

baseline 1.3


Sensory History: Reports: Hx Contacts or Glasses - reading


   Denies: Hx Hearing Aid


Opthamlomology History: Reports: Hx Contacts or Glasses - reading


Neurological History: Reports: Hx Dementia - early onset


   Denies: Hx Seizures, Hx Transient Ischemic Attacks (TIA)


Psychiatric History: Reports: Hx Anxiety, Hx Depression





- Cancer History


Cancer Type, Location and Year: None reported





- Surgical History


Surgery Procedure, Year, and Place: Cardiac Stent Placement.  Cholecystectomy


Hx Anesthesia Reactions: No





- Immunization History


Date of Tetanus Vaccine: Unk


Date of Influenza Vaccine: Fall 2014


Infectious Disease History: No


Infectious Disease History: 


   Denies: Traveled Outside the [place] in Last 30 Days





- Family History


Known Family History: Positive: Cardiac Disease - 2 grandparents, Other - 

father had cancer, unknown what kind. mother had breast cancer





- Social History


Alcohol Use: None


Hx Substance Use: No


Substance Use Type: Reports: None


Hx Tobacco Use: Yes - smokes 1/2 pack per day


Smoking Status (MU): Heavy Every Day Tobacco Smoker


Type: Cigarettes


Amount Used/How Often: 1 pack q day


Have You Smoked in the Last Year: Yes





Review of Systems


Negative: Fever


Positive: Chest Pain - Left chest wall


Positive: Arthralgia - Left hip, Myalgia


Negative: Syncope


All Other Systems Reviewed And Are Negative: Yes





Physical Exam





- Summary


Physical Exam Summary: 





Constitutional: Well-developed, Well-nourished, elderly, obese Alert


HENT: Normocephalic. Atraumatic, No abrasions/contusions,  No trismus. Nasal 

Cannula in place. 


Eyes: EOM normal, PERRL


Neck: Trachea midline, No stridor, No cervical step off, No posterior cervical 

spine tenderness


Cardio: Rhythm regular, rate normal, Heart sounds normal, Radial pulses are 2+ 

and symmetric.


Pulmonary/Chest wall: Effort normal, Breath sounds normal, (-) Stridor, Equal 

chest rise. Mild L sided lower lateral rib tenderness. 


Abd: Soft, Appearance normal. (-) Distension, (-) Tenderness.


Musculoskeletal: No obvious extremity trauma. No TL midline tenderness. TTP to 

sacrum, lower left rib, and lateral left hip. 


Neuro: Alert, GCS 15. Strength 5/5 all extremities. Ambulation deferred 


Skin: Warm, Dry, Skin intact





Triage Information Reviewed: Yes


Vital Signs On Initial Exam: 


 Initial Vitals











Temp Pulse Resp BP Pulse Ox


 


 97.4 F   61   20   184/84   93 


 


 02/02/20 17:53  02/02/20 17:53  02/02/20 17:53  02/02/20 17:53  02/02/20 17:53











Vital Signs Reviewed: Yes





Procedures





- Sedation


Patient Received Moderate/Deep Sedation with Procedure: No





Diagnostics





- Vital Signs


 Vital Signs











  Temp Pulse Resp BP Pulse Ox


 


 02/02/20 17:53  97.4 F  61  20  184/84  93














- Laboratory


Lab Statement: Any lab studies that have been ordered have been reviewed, and 

results considered in the medical decision making process.





- Radiology


  ** Sacrum XR


Radiology Interpretation Completed By: ED Physician


Summary of Radiographic Findings: No acute fracture. Pending official read. An 

ED physician has reviewed and interpreted this scan.





  ** Pelvis XR


Radiology Interpretation Completed By: ED Physician


Summary of Radiographic Findings: No acute fracture. Pending official read. An 

ED physician has reviewed and interpreted this scan.





  ** CXR


Radiology Interpretation Completed By: ED Physician


Summary of Radiographic Findings: No acute fracture. Pending official read. An 

ED physician has reviewed and interpreted this scan.





Re-Evaluation





- Re-Evaluation


  ** First Eval


Re-Evaluation Time: 19:50


Comment: Patient unable to ambulate with effort, only able to stand. Plan for 

pelvic CT.





  ** Second Eval


Re-Evaluation Time: 21:35


Comment: I have discussed results with the patient. Discussed symptoms that 

warrant immediate return to ED.





Adult Trauma Course/Dx





- Course


Course Of Treatment: 70 y/o F w hx CHF on home O2, p/w fall from sitting.  - VS 

w baseline hypoxia, on home O2. PE w TTP L lateral ribs, coccyx, L hip.  no 

midline CTL tenderness. Plain films neg for fr. Given tylenol for pain.  Signed 

out pending re evaluation, ambulation. If unable to ambulate would check CT 

pelvis to r/o occult fr.





- Diagnoses


Provider Diagnoses: 


 Fall, Pain in buttock, Rib pain on left side








Discharge ED





- Sign-Out/Discharge


Documenting (check all that apply): Sign-Out Patient


Signing out patient TO: Yarelis Mcgill - Patient is a signout to Dr. Mcgill at 

change of shifts at 1900 on 2/2/20.





- Discharge Plan


Condition: Stable


Disposition: HOME


Patient Education Materials:  Fall Prevention for Older Adults (ED)


Referrals: 


Gonzalez COOPER,Shabbir ROWELL [Primary Care Provider] - 3 Days


Additional Instructions: 


Please follow up with your primary care physician within three days. Please 

return to ED for any new or worsening symptoms.





- Billing Disposition and Condition


Condition: STABLE


Disposition: Home





- Attestation Statements


Document Initiated by Scribe: Yes


Documenting Scribe: Jacinto Elder


Provider For Whom Scribe is Documenting (Include Credential): Amanda Marquez MD


Scribe Attestation: 


IJacinto scribed for Amanda Marquez MD on 02/03/20 at 0742. 


Scribe Documentation Reviewed: Yes


Provider Attestation: 


The documentation as recorded by the scribe, Jacinto Elder accurately 

reflects the service I personally performed and the decisions made by me, 

Amanda Marquez MD


Status of Scribe Document: Viewed

## 2020-02-02 NOTE — ED
Progress





- Progress Note


Progress Note: 


The patient is a sign-out from Dr. Amanda Marquez MD, to Dr. Yarelis Mcgill MD, at change of shift at 1900 on 2/2/2020, pending ambulation and 

disposition.





Patient unable to ambulate well. We will order a Pelvis CT.





Pelvis CT is negative for fracture.





Patient is safe for discharge. Her daughter will come pick her up.





- Results/Orders


Results/Orders: 


Pelvis CT Impression: No visible acute fracture or dislocation. ED physician 

has reviewed this report.





Re-Evaluation





- Re-Evaluation


  ** First Eval


Re-Evaluation Time: 19:50


Comment: Patient unable to ambulate with effort, only able to stand. Plan for 

pelvic CT.





  ** Second Eval


Re-Evaluation Time: 21:35


Comment: I have discussed results with the patient. Discussed symptoms that 

warrant immediate return to ED.





Course/Dx





- Course


Course Of Treatment: 69 year old female signed out to me after fall. still with 

hip pain and difficulty ambulating. ct pelvis negative. patient states she 

would rather go home than be admitted. ambulater with walker. discharged to 

home. follow up with PCP, follow up sooner for any worsening symptoms.





- Diagnoses


Provider Diagnoses: 


 Fall, Pain in buttock, Rib pain on left side








Discharge ED





- Sign-Out/Discharge


Documenting (check all that apply): Patient Departure - Patient will be 

discharged home., Receiving Sign-Out


Receiving patient FROM: Amanda Marquez - Patient is a sign-out from Dr. Amanda Marquez MD, at 1900 on 2/2/2020, pending ambulation and disposition.





- Discharge Plan


Condition: Stable


Disposition: HOME


Patient Education Materials:  Fall Prevention for Older Adults (ED)


Referrals: 


Gonzalez COOPER,Shabbir ROWELL [Primary Care Provider] - 3 Days


Additional Instructions: 


Please follow up with your primary care physician within three days. Please 

return to ED for any new or worsening symptoms.





- Billing Disposition and Condition


Condition: STABLE


Disposition: Home





- Attestation Statements


Document Initiated by Scribe: Yes


Documenting Scribe: Latasha Hugo


Provider For Whom Scribe is Documenting (Include Credential): Dr. Yarelis Mcgill MD


Scribe Attestation: 


ILatasha, muibed for Dr. Yarelis Mcgill MD on 02/03/20 at 0013. 


Scribe Documentation Reviewed: Yes


Provider Attestation: 


The documentation as recorded by the scribe, Latasha Hugo accurately reflects 

the service I personally performed and the decisions made by me, Dr. Yarelis Mcgill MD


Status of Scribe Document: Viewed





Procedures





- Sedation


Patient Received Moderate/Deep Sedation with Procedure: No